# Patient Record
Sex: FEMALE | Race: WHITE | URBAN - METROPOLITAN AREA
[De-identification: names, ages, dates, MRNs, and addresses within clinical notes are randomized per-mention and may not be internally consistent; named-entity substitution may affect disease eponyms.]

---

## 2017-04-28 ENCOUNTER — OFFICE VISIT (OUTPATIENT)
Dept: FAMILY MEDICINE CLINIC | Age: 32
End: 2017-04-28

## 2017-04-28 VITALS
WEIGHT: 143 LBS | DIASTOLIC BLOOD PRESSURE: 77 MMHG | HEART RATE: 62 BPM | TEMPERATURE: 98.2 F | SYSTOLIC BLOOD PRESSURE: 119 MMHG | BODY MASS INDEX: 23.82 KG/M2 | HEIGHT: 65 IN | OXYGEN SATURATION: 100 % | RESPIRATION RATE: 14 BRPM

## 2017-04-28 DIAGNOSIS — K64.4 EXTERNAL HEMORRHOIDS: ICD-10-CM

## 2017-04-28 DIAGNOSIS — Z30.432 ENCOUNTER FOR IUD REMOVAL: Primary | ICD-10-CM

## 2017-04-28 RX ORDER — HYDROCORTISONE 25 MG/G
CREAM TOPICAL 4 TIMES DAILY
Qty: 30 G | Refills: 0 | Status: SHIPPED | OUTPATIENT
Start: 2017-04-28

## 2017-04-28 NOTE — PROGRESS NOTES
Subjective:   Nicole Siddiqi is an 27 y.o. female who presents for IUD removal.     IUD was placed 7 years ago. She desires for IUD to be removed today. She is not interested in family planning since her  got a vasectomy. She is sexually active with vasiliy. Discussed potential complications of IUD removal including damage to the cervix, vagina, and other surrounding structures; retention of IUD in the uterus; vaginal bleeding; and abdominal/pelvic cramping. Pt verbalized understanding and consented to removal of IUD. Patient also reports some itchy in the rectal area after defecation, states sometimes she sees some blood on paper after having BM, mild constipation, this happens few times a week, has not tried anything for the symptoms. Patient also interested in having a CPE but no labs. Is very active, exercises regularly and follows a healthy diet. Last PAP Smear around 2 years ago per patient report (normal), unsure if she got HPV test along with PAP. Allergies - reviewed:   No Known Allergies      Medications - reviewed:   Current Outpatient Prescriptions   Medication Sig    hydrocortisone (ANUSOL-HC) 2.5 % rectal cream Insert  into rectum four (4) times daily. No current facility-administered medications for this visit. Past Medical History - reviewed:  History reviewed. No pertinent past medical history. Past Surgical History - reviewed:   Past Surgical History:   Procedure Laterality Date    HX GYN  2/1/15             Social History - reviewed:  Social History     Social History    Marital status:      Spouse name: N/A    Number of children: N/A    Years of education: N/A     Occupational History    Not on file.      Social History Main Topics    Smoking status: Never Smoker    Smokeless tobacco: Not on file    Alcohol use Yes      Comment: occasionallys    Drug use: No    Sexual activity: Not on file     Other Topics Concern    Not on file Social History Narrative         Immunizations - reviewed:     ROS:  GI: + blood in paper after BM, rectal itching.  Denies abdominal pain  : Denies pelvic cramping, vaginal bleeding, and vaginal discharge      Objective:     Visit Vitals    /77    Pulse 62    Temp 98.2 °F (36.8 °C) (Oral)    Resp 14    Ht 5' 5\" (1.651 m)    Wt 143 lb (64.9 kg)    SpO2 100%    BMI 23.8 kg/m2     General appearance - alert, well appearing, and in no distress  Mental status - alert, oriented to person, place, and time  Eyes - pupils equal and reactive, extraocular eye movements intact  Ears - bilateral TM's and external ear canals normal  Nose - normal and patent, no erythema, discharge or polyps  Mouth - mucous membranes moist, pharynx normal without lesions  Neck - supple, no significant adenopathy   Chest - clear to auscultation, no wheezes, rales or rhonchi, symmetric air entry  Heart - normal rate, regular rhythm, normal S1, S2, no murmurs, rubs, clicks or gallops  Abdomen - soft, nontender, nondistended, no masses or organomegaly  Neurological - alert, oriented, normal speech, no focal findings or movement disorder   Extremities - peripheral pulses normal, no pedal edema, no clubbing or cyanosis  Skin - normal coloration and turgor, no rashes, no suspicious skin lesions noted  Pelvic exam: normal external genitalia, vulva, vagina, cervix, uterus and adnexa, exam chaperoned by Leo Main MD.      [address] [facility]  OFFICE PROCEDURE PROGRESS NOTE        Chart reviewed for the following:  Nallely Villegas MD, have reviewed the History, Physical and updated the Allergic reactions for [address] performed immediately prior to start of procedure:  Nallely Villegas MD, have performed the following reviews on [address] [place] [address] prior to the start of the procedure:            * Patient was identified by name and date of birth   * Agreement on procedure being performed was verified  * Risks and Benefits explained to the patient  * Procedure site verified and marked as necessary  * Patient was positioned for comfort  * Consent was signed and verified     Time: 11:13 AM    Date of procedure: 4/28/2017    Procedure performed by:  Yudy Crowley MD    Provider assisted by: Skyler Donovan MD    Patient assisted by: self    How tolerated by patient: tolerated the procedure well with no complications    Procedure in detail:    Pt placed in dorsal lithotomy position. A sterile speculum was placed in the patients vagina. The 2 IUD strings were visualized in the cervix. The strings were grasped using ringed forceps and gentle traction was applied to pull the IUD out of the cervix. The IUD was completely removed from the cervix and noted to be intact to visual inspection. The patient tolerated the procedure well. Assessment/Plan:       ICD-10-CM ICD-9-CM    1. Encounter for IUD removal Z30.432 V25.12 REMOVE INTRAUTERINE DEVICE   2. External hemorrhoids K64.4 455.3 hydrocortisone (ANUSOL-HC) 2.5 % rectal cream       IUD Removal:  · Informed patient that she may have some abdominal and/or pelvic cramping up to 1 week after removal. She can take Ibuprofen o rTylenol prn to relieve these symptoms. · Informed patient that she may have some vaginal spotting up to 1 week after removal.   · She is to return to clinic or go to the ER if she starts to experience severe abdominal or pelvic pain or heavy vaginal bleeding. Hemorrhoids:   1. Sitz bath twice or three times a day  2. High fiber diet  3. Plenty of fluids, fruits and vegetables to keep stools soft and prevent constipation  4. Fiber supplement (20-30g/day)  5. Tuc's pads after each bowel movement  6. Anusol as directed on the package    Sitz bath, Tuc's pads and hemorrhoid cream should give some early relief with resolution of symptoms in a few days to a week.   If above measures do not help then may consider referral to GI      I have discussed the diagnosis with the patient and the intended plan as seen in the above orders. Patient verbalized understanding of the plan and agrees with the plan. The patient has received an after-visit summary and questions were answered concerning future plans. I have discussed medication side effects and warnings with the patient as well. Informed patient to return to the office if new symptoms arise.       Dr. Jaxson Resendiz (attending) present throughout entire procedure      Pb Kim MD  Family Medicine Resident

## 2017-04-28 NOTE — PATIENT INSTRUCTIONS
IUD Removal: Care Instructions  Your Care Instructions    The intrauterine device (IUD) is a method of birth control. It is a small, plastic, T-shaped device that contains copper or hormones. It is placed in your uterus. You may have had your IUD removed because you want to become pregnant. Or maybe it caused pain, bleeding, or an infection. You may have chosen another method of birth control. If you don't want to get pregnant, make sure to use another form of birth control now that your IUD is not in place. Talk to your doctor about other forms of birth control. Follow-up care is a key part of your treatment and safety. Be sure to make and go to all appointments, and call your doctor if you are having problems. It's also a good idea to know your test results and keep a list of the medicines you take. How can you care for yourself at home? · IUD removal does not usually cause any pain or problems if the IUD is removed because you want to become pregnant or because of bleeding. · Once the IUD is taken out, you can become pregnant. If you want to become pregnant, you can start trying to have a baby as soon as you like. · If your doctor prescribed antibiotics because of an infection, take them as directed. Do not stop taking them just because you feel better. You need to take the full course of antibiotics. When should you call for help? Call 911 anytime you think you may need emergency care. For example, call if:  · You passed out (lost consciousness). Call your doctor now or seek immediate medical care if:  · You have severe vaginal bleeding. This means that you are soaking through your usual pads or tampons every hour for 2 or more hours and passing clots of blood. · You have a fever. · You feel sick to your stomach, or you vomit. · You have new or worse pelvic pain. · You have new or worse belly pain. · You are dizzy or lightheaded, or you feel like you may faint.   Watch closely for changes in your health, and be sure to contact your doctor if you have any problems. Where can you learn more? Go to http://kylie-severino.info/. Enter A487 in the search box to learn more about \"IUD Removal: Care Instructions. \"  Current as of: May 30, 2016  Content Version: 11.2  © 2549-4585 [employer]. Care instructions adapted under license by [employer]ock (which disclaims liability or warranty for this information). If you have questions about a medical condition or this instruction, always ask your healthcare professional. [address] any warranty or liability for your use of this information. Hemorrhoids: Care Instructions  Your Care Instructions    Hemorrhoids are enlarged veins that develop in the anal canal. Bleeding during bowel movements, itching, swelling, and rectal pain are the most common symptoms. They can be uncomfortable at times, but hemorrhoids rarely are a serious problem. You can treat most hemorrhoids with simple changes to your diet and bowel habits. These changes include eating more fiber and not straining to pass stools. Most hemorrhoids do not need surgery or other treatment unless they are very large and painful or bleed a lot. Follow-up care is a key part of your treatment and safety. Be sure to make and go to all appointments, and call your doctor if you are having problems. Its also a good idea to know your test results and keep a list of the medicines you take. How can you care for yourself at home? · Sit in a few inches of warm water (sitz bath) 3 times a day and after bowel movements. The warm water helps with pain and itching. · Put ice on your anal area several times a day for 10 minutes at a time. Put a thin cloth between the ice and your skin. Follow this by placing a warm, wet towel on the area for another 10 to 20 minutes. · Take pain medicines exactly as directed.   ¨ If the doctor gave you a prescription medicine for pain, take it as prescribed. ¨ If you are not taking a prescription pain medicine, ask your doctor if you can take an over-the-counter medicine. · Keep the anal area clean, but be gentle. Use water and a fragrance-free soap, such as [place], or use baby wipes or medicated pads, such as Tucks. · Wear cotton underwear and loose clothing to decrease moisture in the anal area. · Eat more fiber. Include foods such as whole-grain breads and cereals, raw vegetables, raw and dried fruits, and beans. · Drink plenty of fluids, enough so that your urine is light yellow or clear like water. If you have kidney, heart, or liver disease and have to limit fluids, talk with your doctor before you increase the amount of fluids you drink. · Use a stool softener that contains bran or psyllium. You can save money by buying bran or psyllium (available in bulk at most health food stores) and sprinkling it on foods or stirring it into fruit juice. Or you can use a product such as Metamucil or Hydrocil. · Practice healthy bowel habits. ¨ Go to the bathroom as soon as you have the urge. ¨ Avoid straining to pass stools. Relax and give yourself time to let things happen naturally. ¨ Do not hold your breath while passing stools. ¨ Do not read while sitting on the toilet. Get off the toilet as soon as you have finished. · Take your medicines exactly as prescribed. Call your doctor if you think you are having a problem with your medicine. When should you call for help? Call 911 anytime you think you may need emergency care. For example, call if:  · You pass maroon or very bloody stools. Call your doctor now or seek immediate medical care if:  · You have increased pain. · You have increased bleeding. Watch closely for changes in your health, and be sure to contact your doctor if:  · Your symptoms have not improved after 3 or 4 days. Where can you learn more? Go to http://laura.info/.   Enter F228 in the search box to learn more about \"Hemorrhoids: Care Instructions. \"  Current as of: August 9, 2016  Content Version: 11.2  © 5358-1687 [employer]Audience[employer]. Care instructions adapted under license by ArmedZilla (which disclaims liability or warranty for this information). If you have questions about a medical condition or this instruction, always ask your healthcare professional. [place] [address] any warranty or liability for your use of this information.

## 2017-04-28 NOTE — MR AVS SNAPSHOT
Visit Information Date & Time Provider Department Dept. Phone Encounter #  
 4/28/2017  8:20 AM Shaan Espino, Eulalia Roy [phone] [id number] Upcoming Health Maintenance Date Due DTaP/Tdap/Td series (1 - Tdap) 4/5/2004 PAP AKA CERVICAL CYTOLOGY 4/5/2004 INFLUENZA AGE 9 TO ADULT 8/1/2016 Allergies as of 4/28/2017  Review Complete On: 4/28/2017 By: Shea Ventura LPN No Known Allergies Current Immunizations  Never Reviewed No immunizations on file. Not reviewed this visit You Were Diagnosed With   
  
 Codes Comments Encounter for IUD removal    -  Primary ICD-10-CM: F99.149 ICD-9-CM: V25.12 External hemorrhoids     ICD-10-CM: K64.4 ICD-9-CM: 662. 3 Vitals BP Pulse Temp Resp Height(growth percentile) Weight(growth percentile) 119/77 62 98.2 °F (36.8 °C) (Oral) 14 5' 5\" (1.651 m) 143 lb (64.9 kg) SpO2 BMI OB Status Smoking Status 100% 23.8 kg/m2 IUD Never Smoker Vitals History BMI and BSA Data Body Mass Index Body Surface Area  
 23.8 kg/m 2 1.73 m 2 Preferred Pharmacy Pharmacy Name Phone [facility] [address] [place] [address] [phone] Your Updated Medication List  
  
   
This list is accurate as of: 4/28/17  9:07 AM.  Always use your most recent med list.  
  
  
  
  
 hydrocortisone 2.5 % rectal cream  
Commonly known as:  ANUSOL-HC Insert  into rectum four (4) times daily. Prescriptions Sent to Pharmacy Refills  
 hydrocortisone (ANUSOL-HC) 2.5 % rectal cream 0 Sig: Insert  into rectum four (4) times daily. Class: Normal  
 Pharmacy: [facility], [place] [address] [place] [address] #: [phone] Route: Rectal  
  
We Performed the Following REMOVE INTRAUTERINE DEVICE [55371 CPT(R)] Patient Instructions IUD Removal: Care Instructions Your Care Instructions The intrauterine device (IUD) is a method of birth control. It is a small, plastic, T-shaped device that contains copper or hormones. It is placed in your uterus. You may have had your IUD removed because you want to become pregnant. Or maybe it caused pain, bleeding, or an infection. You may have chosen another method of birth control. If you don't want to get pregnant, make sure to use another form of birth control now that your IUD is not in place. Talk to your doctor about other forms of birth control. Follow-up care is a key part of your treatment and safety. Be sure to make and go to all appointments, and call your doctor if you are having problems. It's also a good idea to know your test results and keep a list of the medicines you take. How can you care for yourself at home? · IUD removal does not usually cause any pain or problems if the IUD is removed because you want to become pregnant or because of bleeding. · Once the IUD is taken out, you can become pregnant. If you want to become pregnant, you can start trying to have a baby as soon as you like. · If your doctor prescribed antibiotics because of an infection, take them as directed. Do not stop taking them just because you feel better. You need to take the full course of antibiotics. When should you call for help? Call 911 anytime you think you may need emergency care. For example, call if: 
· You passed out (lost consciousness). Call your doctor now or seek immediate medical care if: 
· You have severe vaginal bleeding. This means that you are soaking through your usual pads or tampons every hour for 2 or more hours and passing clots of blood. · You have a fever. · You feel sick to your stomach, or you vomit. · You have new or worse pelvic pain. · You have new or worse belly pain. · You are dizzy or lightheaded, or you feel like you may faint.  
Watch closely for changes in your health, and be sure to contact your doctor if you have any problems. Where can you learn more? Go to http://kylie-severino.info/. Enter Q656 in the search box to learn more about \"IUD Removal: Care Instructions. \" Current as of: May 30, 2016 Content Version: 11.2 © 6511-9251 [employer]Value. Care instructions adapted under license by Librestream Technologies [employer]. (which disclaims liability or warranty for this information). If you have questions about a medical condition or this instruction, always ask your healthcare professional. [place] [address] any warranty or liability for your use of this information. Hemorrhoids: Care Instructions Your Care Instructions Hemorrhoids are enlarged veins that develop in the anal canal. Bleeding during bowel movements, itching, swelling, and rectal pain are the most common symptoms. They can be uncomfortable at times, but hemorrhoids rarely are a serious problem. You can treat most hemorrhoids with simple changes to your diet and bowel habits. These changes include eating more fiber and not straining to pass stools. Most hemorrhoids do not need surgery or other treatment unless they are very large and painful or bleed a lot. Follow-up care is a key part of your treatment and safety. Be sure to make and go to all appointments, and call your doctor if you are having problems. Its also a good idea to know your test results and keep a list of the medicines you take. How can you care for yourself at home? · Sit in a few inches of warm water (sitz bath) 3 times a day and after bowel movements. The warm water helps with pain and itching. · Put ice on your anal area several times a day for 10 minutes at a time. Put a thin cloth between the ice and your skin. Follow this by placing a warm, wet towel on the area for another 10 to 20 minutes. · Take pain medicines exactly as directed. ¨ If the doctor gave you a prescription medicine for pain, take it as prescribed. ¨ If you are not taking a prescription pain medicine, ask your doctor if you can take an over-the-counter medicine. · Keep the anal area clean, but be gentle. Use water and a fragrance-free soap, such as [place], or use baby wipes or medicated pads, such as Tucks. · Wear cotton underwear and loose clothing to decrease moisture in the anal area. · Eat more fiber. Include foods such as whole-grain breads and cereals, raw vegetables, raw and dried fruits, and beans. · Drink plenty of fluids, enough so that your urine is light yellow or clear like water. If you have kidney, heart, or liver disease and have to limit fluids, talk with your doctor before you increase the amount of fluids you drink. · Use a stool softener that contains bran or psyllium. You can save money by buying bran or psyllium (available in bulk at most health food stores) and sprinkling it on foods or stirring it into fruit juice. Or you can use a product such as Metamucil or Hydrocil. · Practice healthy bowel habits. ¨ Go to the bathroom as soon as you have the urge. ¨ Avoid straining to pass stools. Relax and give yourself time to let things happen naturally. ¨ Do not hold your breath while passing stools. ¨ Do not read while sitting on the toilet. Get off the toilet as soon as you have finished. · Take your medicines exactly as prescribed. Call your doctor if you think you are having a problem with your medicine. When should you call for help? Call 911 anytime you think you may need emergency care. For example, call if: 
· You pass maroon or very bloody stools. Call your doctor now or seek immediate medical care if: 
· You have increased pain. · You have increased bleeding. Watch closely for changes in your health, and be sure to contact your doctor if: 
· Your symptoms have not improved after 3 or 4 days. Where can you learn more? Go to http://laura.info/. Enter F228 in the search box to learn more about \"Hemorrhoids: Care Instructions. \" Current as of: August 9, 2016 Content Version: 11.2 © 3088-6861 Jobzle, [employer]ensor[employer]. Care instructions adapted under license by Giftology (which disclaims liability or warranty for this information). If you have questions about a medical condition or this instruction, always ask your healthcare professional. [address] any warranty or liability for your use of this information. Introducing [facility]! Itzel Kuhn introduces OurShelf patient portal. Now you can access parts of your medical record, email your doctor's office, and request medication refills online. 1. In your internet browser, go to [email or web address] 2. Click on the First Time User? Click Here link in the Sign In box. You will see the New Member Sign Up page. 3. Enter your OurShelf Access Code exactly as it appears below. You will not need to use this code after youve completed the sign-up process. If you do not sign up before the expiration date, you must request a new code. · OurShelf Access Code: [email or web address] Expires: 7/27/2017  8:57 AM 
 
4. Enter the last four digits of your Social Security Number (xxxx) and Date of Birth (mm/dd/yyyy) as indicated and click Submit. You will be taken to the next sign-up page. 5. Create a OurShelf ID. This will be your OurShelf login ID and cannot be changed, so think of one that is secure and easy to remember. 6. Create a OurShelf password. You can change your password at any time. 7. Enter your Password Reset Question and Answer. This can be used at a later time if you forget your password. 8. Enter your e-mail address. You will receive e-mail notification when new information is available in [address] 9. Click Sign Up. You can now view and download portions of your medical record. 10. Click the Download Summary menu link to download a portable copy of your medical information. If you have questions, please visit the Frequently Asked Questions section of the Single Digits website. Remember, Single Digits is NOT to be used for urgent needs. For medical emergencies, dial 911. Now available from your iPhone and Android! Please provide this summary of care documentation to your next provider. Your primary care clinician is listed as [address] [place] [address]. If you have any questions after today's visit, please call [phone].

## 2018-12-20 ENCOUNTER — OFFICE VISIT (OUTPATIENT)
Dept: FAMILY MEDICINE CLINIC | Age: 33
End: 2018-12-20

## 2018-12-20 VITALS
BODY MASS INDEX: 24.49 KG/M2 | DIASTOLIC BLOOD PRESSURE: 70 MMHG | OXYGEN SATURATION: 98 % | SYSTOLIC BLOOD PRESSURE: 113 MMHG | RESPIRATION RATE: 16 BRPM | TEMPERATURE: 98.9 F | HEART RATE: 65 BPM | HEIGHT: 65 IN | WEIGHT: 147 LBS

## 2018-12-20 DIAGNOSIS — M53.3 SACRO ILIAL PAIN: Primary | ICD-10-CM

## 2018-12-20 RX ORDER — CYCLOBENZAPRINE HCL 10 MG
5 TABLET ORAL
Qty: 15 TAB | Refills: 0 | Status: SHIPPED | OUTPATIENT
Start: 2018-12-20

## 2018-12-20 NOTE — PATIENT INSTRUCTIONS
Sacroiliac Joint Pain: Care Instructions  Your Care Instructions    The sacroiliac joints connect the spine and each side of the pelvis. These joints bear the weight and stress of your torso. This makes them easy to injure. Injury or overuse of these joints may cause low back pain. Stress on these joints can cause joint pain. Sacroiliac joint pain is more common in pregnant women. Certain kinds of arthritis also may cause this type of joint pain. Home treatment may help you feel better. So can avoiding activities that stress your back. Your doctor also may recommend physical therapy. This may include doing exercises and stretches to help with pain. You may also learn to use good posture. Follow-up care is a key part of your treatment and safety. Be sure to make and go to all appointments, and call your doctor if you are having problems. It's also a good idea to know your test results and keep a list of the medicines you take. How can you care for yourself at home? · Ask your doctor about light exercises that may help your back pain. Try to do light activity throughout the day. But make sure to take rests as needed. Find a comfortable position for rest, but don't stay in one position for too long. Avoid activities that cause pain. · To apply heat, put a warm water bottle, a heating pad set on low, or a warm cloth on your back. Do not go to sleep with a heating pad on your skin. · Put ice or a cold pack on your back for 10 to 20 minutes at a time. Put a thin cloth between the ice and your skin. · If the doctor gave you a prescription medicine for pain, take it as prescribed. · If you are not taking a prescription pain medicine, ask your doctor if you can take an over-the-counter pain medicine, such as acetaminophen (Tylenol), ibuprofen (Advil, Motrin), or naproxen (Aleve). Read and follow all instructions on the label. Take pain medicines exactly as directed.   · Do not take two or more pain medicines at the same time unless the doctor told you to. Many pain medicines have acetaminophen, which is Tylenol. Too much acetaminophen (Tylenol) can be harmful. · To prevent future back pain, do exercises to stretch and strengthen your back and stomach. Learn how to use good posture, safe lifting techniques, and proper body mechanics. When should you call for help? Call 911 anytime you think you may need emergency care. For example, call if:    · You are unable to move a leg at all.   [facility] your doctor now or seek immediate medical care if:    · You have new or worse symptoms in your legs or buttocks. Symptoms may include:  ? Numbness or tingling. ? Weakness. ? Pain.     · You lose bladder or bowel control.    Watch closely for changes in your health, and be sure to contact your doctor if:    · You are not getting better as expected. Where can you learn more? Go to http://kylie-severino.info/. Enter E923 in the search box to learn more about \"Sacroiliac Joint Pain: Care Instructions. \"  Current as of: November 29, 2017  Content Version: 11.8  © 2209-9928 [employer]. Care instructions adapted under license by [employer] (which disclaims liability or warranty for this information). If you have questions about a medical condition or this instruction, always ask your healthcare professional. [address] any warranty or liability for your use of this information.

## 2018-12-20 NOTE — PROGRESS NOTES
[address] [facility] [address] [address]   Office [phone], Fax [phone]    Subjective:     Chief Complaint   Patient presents with    Back Pain     after working out 12/17/18     History provided by patient     HPI:  Ramiro Fung is a 26 y.o. WHITE OR  female presents for back pain. No significant history pertinent to presentation. Back Pain  - Pt was working out on Monday (insanity workout for first time) and starting tuesday more her back pain was hurting. Mainly worse with bending. Been taking Ibuprofen 600mg TID and icing with minimal relief. Denies fever/chills, weakness, sensation difference, urine/bowel incontinence. Medication reviewed. Allergy reviewed. ROS (bolded are positive):   General Negative for fever, chills, changes in weight, changes in appetite   CV Negative for chest pain, palpitations, edema   Respiratory Negative for cough, shortness of breath, wheezing   MSK Negative for back pain, joint pain, muscle pain   Neuro Negative for dizziness, headache, confusion, weakness   Psych Negative for anxiety, depression, change in mood     Objective:   Vitals - reviewed  Visit Vitals  /70   Pulse 65   Temp 98.9 °F (37.2 °C) (Oral)   Resp 16   Ht 5' 5\" (1.651 m)   Wt 147 lb (66.7 kg)   SpO2 98%   BMI 24.46 kg/m²        Physical exam:   GEN: NAD. Alert. Well nourished. LUNGS: Respirations unlabored; CTAB. no wheeze, rales, rhonchi   CARDIOVASCULAR: Regular, rate, and rhythm without murmurs, gallops or rubs   ABDOMEN: Soft; NT, ND. +bowel sounds; no rebound tenderness, no guarding. no masses or organomegaly  NEUROLOGIC:  CN II-XII intact. Strength and sensation intact. EXT: Well perfused. No edema. No erythema. PSYCH: appropriate mood and affect. Good insight and judgement. Cooperative. SKIN: No obvious rashes or lesions.      MSK:    Posture: Normal   Deformity: None    ROM:     Flexion: Normal    Extension: Normal     Lateral bending: Normal Gait: Normal       Palpation:    L1-L5: No tenderness    Sacrum-iliac: positive tenderness    Coccyx: No tenderness    Left Paraspinal: No tenderness    Right Paraspinal: positive tenderness     Strength (0-5/5)    Hip Flexion:   Left: 5/5  Right: 5/5    Hip Extension:  Left: 5/5  Right: 5/5    Hip Abduction:  Left: 5/5  Right: 5/5    Hip Adduction:  Left: 5/5  Right: 5/5    Knee Extension:  Left: 5/5  Right: 5/5    Knee Flexion:   Left: 5/5  Right: 5/5     Sensation: Intact, no deficits      DTR:    Patella:  Left: +2  Right: +2    Achilles:  Left: +2  Right: +2     Special test:    Straight leg: Left: Negative  Right: Negative    Jerads: Left: Negative  Right: Negative    Piriformis: Left: Negative  Right: Negative    Gaenslen:        Left: Negative  Right: Negative    Pertinent Labs/Studies:   - n/a    Assessment and orders:       ICD-10-CM ICD-9-CM    1. Sacro ilial pain M53.3 724.6 REFERRAL TO PHYSICAL THERAPY      cyclobenzaprine (FLEXERIL) 10 mg tablet     Diagnoses and all orders for this visit:    1. Sacro ilial pain. Given flexeril for spasm and told to only take it for the first week. To continue to take anti-inflammatory (ibuprofen) and to ice/heat as it helps. Referred to physical therapy. Informed about our sports medicine specialist and to return if would like steroid injections, if not improved. -     REFERRAL TO PHYSICAL THERAPY  -     cyclobenzaprine (FLEXERIL) 10 mg tablet; Take 0.5 Tabs by mouth three (3) times daily as needed for Muscle Spasm(s). Follow-up Disposition:  Return if symptoms worsen or fail to improve. Pt was discussed with Dr Fabrice Christianson (attending physician). I have reviewed patient medical and social history and medications. I have reviewed pertinent labs results and other data. I have discussed the diagnosis with the patient and the intended plan as seen in the above orders.  The patient has received an after-visit summary and questions were answered concerning future plans. I have discussed medication side effects and warnings with the patient as well.     Franki Lugo MD  Resident [facility]  12/20/18

## 2018-12-20 NOTE — PROGRESS NOTES
Chief Complaint   Patient presents with    Back Pain     after working out 12/17/18     1. Have you been to the ER, urgent care clinic since your last visit? Hospitalized since your last visit? No    2. Have you seen or consulted any other health care providers outside of the [address] [place] [address] since your last visit? Include any pap smears or colon screening.  No

## 2018-12-21 NOTE — PROGRESS NOTES
[facility] Residency Attending Addendum:  I saw and evaluated the patient on the day of the encounter with Layla Flores MD  , performing the key elements of the service. I discussed the findings, assessment and plan with the resident and agree with the resident's findings and plan as documented in the resident's note.       Costa Bush MD, CAQSM, RMSK

## 2019-03-14 ENCOUNTER — HOSPITAL ENCOUNTER (OUTPATIENT)
Dept: LAB | Age: 34
Discharge: HOME | End: 2019-03-14
Payer: COMMERCIAL

## 2019-03-14 ENCOUNTER — OFFICE VISIT (OUTPATIENT)
Dept: FAMILY MEDICINE CLINIC | Age: 34
End: 2019-03-14

## 2019-03-14 VITALS
HEART RATE: 63 BPM | BODY MASS INDEX: 23.66 KG/M2 | RESPIRATION RATE: 16 BRPM | DIASTOLIC BLOOD PRESSURE: 73 MMHG | TEMPERATURE: 98.3 F | HEIGHT: 65 IN | WEIGHT: 142 LBS | SYSTOLIC BLOOD PRESSURE: 115 MMHG | OXYGEN SATURATION: 100 %

## 2019-03-14 DIAGNOSIS — Z01.419 WELL WOMAN EXAM WITH ROUTINE GYNECOLOGICAL EXAM: Primary | ICD-10-CM

## 2019-03-14 DIAGNOSIS — K64.9 HEMORRHOIDS, UNSPECIFIED HEMORRHOID TYPE: ICD-10-CM

## 2019-03-14 DIAGNOSIS — K59.00 CONSTIPATION, UNSPECIFIED CONSTIPATION TYPE: ICD-10-CM

## 2019-03-14 DIAGNOSIS — Z71.84 ENCOUNTER FOR COUNSELING FOR TRAVEL: ICD-10-CM

## 2019-03-14 PROCEDURE — 88175 CYTOPATH C/V AUTO FLUID REDO: CPT

## 2019-03-14 PROCEDURE — 87624 HPV HI-RISK TYP POOLED RSLT: CPT

## 2019-03-14 RX ORDER — POLYETHYLENE GLYCOL 3350 17 G/17G
17 POWDER, FOR SOLUTION ORAL DAILY
Qty: 30 PACKET | Refills: 4 | Status: SHIPPED | OUTPATIENT
Start: 2019-03-14 | End: 2019-06-12

## 2019-03-14 NOTE — PROGRESS NOTES
1. Have you been to the ER, urgent care clinic since your last visit? Hospitalized since your last visit? Yes, 02/19/19 [facility] for strep throat    2. Have you seen or consulted any other health care providers outside of the [address] [place] [address] since your last visit? Include any pap smears or colon screening. No    Chief Complaint   Patient presents with    Physical    Labs       Blood pressure 115/73, pulse 63, temperature 98.3 °F (36.8 °C), temperature source Oral, resp. rate 16, height 5' 5\" (1.651 m), weight 142 lb (64.4 kg), SpO2 100 %.

## 2019-03-14 NOTE — PROGRESS NOTES
[address] [facility]    Subjective:     CC: Complete pgyscial  History provided by patient and chart review    HPI:  Very pleasant 26 yr old female with no significant pmh presenting to clinic today for a complete physical exam. No recent travel, illness or hospitalization. She is requestingabwork and physical prior to upcoming travel. Reports occasional bright blood with wiping after BMs. Has hx of hemorrhoids and intermittent constipation. She does endorse occasional hard stools with moderate amount of strain with BMs. Has tried miralax inconsistently in past. Denies any melena or hematochezia. No abdominal pain, nausea or vomiting. History reviewed. No pertinent past medical history. No Known Allergies  Current Outpatient Medications on File Prior to Visit   Medication Sig Dispense Refill    cyclobenzaprine (FLEXERIL) 10 mg tablet Take 0.5 Tabs by mouth three (3) times daily as needed for Muscle Spasm(s). 15 Tab 0    hydrocortisone (ANUSOL-HC) 2.5 % rectal cream Insert  into rectum four (4) times daily. 30 g 0     No current facility-administered medications on file prior to visit. History reviewed. No pertinent family history. Social History     Socioeconomic History    Marital status:      Spouse name: Not on file    Number of children: Not on file    Years of education: Not on file    Highest education level: Not on file   Tobacco Use    Smoking status: Never Smoker    Smokeless tobacco: Never Used   Substance and Sexual Activity    Alcohol use: Yes     Comment: occasionallys    Drug use: No     Past Surgical History:   Procedure Laterality Date    HX GYN  2/1/15           There is no problem list on file for this patient. Review of Systems   All other systems reviewed and are negative.         Objective:     Visit Vitals  /73 (BP 1 Location: Right arm, BP Patient Position: Sitting)   Pulse 63   Temp 98.3 °F (36.8 °C) (Oral)   Resp 16   Ht 5' 5\" (1.651 m)   Wt 142 lb (64.4 kg)   SpO2 100%   BMI 23.63 kg/m²        Physical Exam   Constitutional: She is oriented to person, place, and time. She appears well-developed and well-nourished. No distress. HENT:   Head: Normocephalic and atraumatic. Nose: Nose normal.   Mouth/Throat: Oropharynx is clear and moist.   Eyes: Conjunctivae and EOM are normal. Pupils are equal, round, and reactive to light. Right eye exhibits no discharge. Left eye exhibits no discharge. Neck: Normal range of motion. Neck supple. No thyromegaly present. Cardiovascular: Normal rate, regular rhythm, normal heart sounds and intact distal pulses. Exam reveals no gallop and no friction rub. No murmur heard. Pulmonary/Chest: Effort normal and breath sounds normal. No respiratory distress. Abdominal: Soft. Bowel sounds are normal. She exhibits no distension and no mass. There is no tenderness. There is no rebound and no guarding. Genitourinary: Vagina normal. No vaginal discharge found. Genitourinary Comments:   Rectal Exam: Visible non-thrombosed external Hemorrhoids at 7 and 5 o'clock positions. Musculoskeletal: Normal range of motion. She exhibits no edema. Lymphadenopathy:     She has no cervical adenopathy. Neurological: She is alert and oriented to person, place, and time. No cranial nerve deficit. She exhibits normal muscle tone. Coordination normal.   Skin: Skin is warm and dry. Psychiatric: She has a normal mood and affect. Her behavior is normal.       Pertinent Labs/Studies:      Assessment and orders:     Diagnoses and all orders for this visit:    1) Well woman exam with routine gynecological exam  -     CBC WITH AUTOMATED DIFF  -     METABOLIC PANEL, COMPREHENSIVE  -     LIPID PANEL  -     PAP IG, APTIMA HPV AND RFX 16/18,45 ([id number])    2) Hemorrhoids, unspecified hemorrhoid type  3) Constipation, unspecified constipation type       -      Trial of Miralax.  Increase dietary fiber and hydration  - polyethylene glycol (MIRALAX) 17 gram packet; Take 1 Packet by mouth daily for 90 days. , Normal, Disp-30 Packet, R-4    4) Encounter for counseling for travel  -     HEPATITIS PANEL, ACUTE        I have reviewed patient medical and social history and medications. I have reviewed pertinent labs results and other data. I have discussed the diagnosis with the patient and the intended plan as seen in the above orders. The patient has received an after-visit summary and questions were answered concerning future plans. I have discussed medication side effects and warnings with the patient as well.     Azalea Cedeno MD  Resident [address] [facility] [address]  03/14/19    Patient discussed with Dr. Ellen Moon, Attending Physician

## 2019-03-15 LAB
ALBUMIN SERPL-MCNC: 4.7 G/DL (ref 3.5–5.5)
ALBUMIN/GLOB SERPL: 1.7 {RATIO} (ref 1.2–2.2)
ALP SERPL-CCNC: 49 IU/L (ref 39–117)
ALT SERPL-CCNC: 11 IU/L (ref 0–32)
AST SERPL-CCNC: 14 IU/L (ref 0–40)
BASOPHILS # BLD AUTO: 0 X10E3/UL (ref 0–0.2)
BASOPHILS NFR BLD AUTO: 1 %
BILIRUB SERPL-MCNC: 0.6 MG/DL (ref 0–1.2)
BUN SERPL-MCNC: 19 MG/DL (ref 6–20)
BUN/CREAT SERPL: 19 (ref 9–23)
CALCIUM SERPL-MCNC: 9.6 MG/DL (ref 8.7–10.2)
CHLORIDE SERPL-SCNC: 100 MMOL/L (ref 96–106)
CHOLEST SERPL-MCNC: 189 MG/DL (ref 100–199)
CO2 SERPL-SCNC: 23 MMOL/L (ref 20–29)
CREAT SERPL-MCNC: 1 MG/DL (ref 0.57–1)
EOSINOPHIL # BLD AUTO: 0.1 X10E3/UL (ref 0–0.4)
EOSINOPHIL NFR BLD AUTO: 2 %
ERYTHROCYTE [DISTWIDTH] IN BLOOD BY AUTOMATED COUNT: 13.4 % (ref 12.3–15.4)
GLOBULIN SER CALC-MCNC: 2.7 G/DL (ref 1.5–4.5)
GLUCOSE SERPL-MCNC: 87 MG/DL (ref 65–99)
HAV IGM SERPL QL IA: NEGATIVE
HBV CORE IGM SERPL QL IA: NEGATIVE
HBV SURFACE AG SERPL QL IA: NEGATIVE
HCT VFR BLD AUTO: 37.7 % (ref 34–46.6)
HCV AB S/CO SERPL IA: <0.1 S/CO RATIO (ref 0–0.9)
HDLC SERPL-MCNC: 63 MG/DL
HGB BLD-MCNC: 12.7 G/DL (ref 11.1–15.9)
IMM GRANULOCYTES # BLD AUTO: 0 X10E3/UL (ref 0–0.1)
IMM GRANULOCYTES NFR BLD AUTO: 0 %
INTERPRETATION, 910389: NORMAL
LDLC SERPL CALC-MCNC: 117 MG/DL (ref 0–99)
LYMPHOCYTES # BLD AUTO: 1 X10E3/UL (ref 0.7–3.1)
LYMPHOCYTES NFR BLD AUTO: 29 %
MCH RBC QN AUTO: 30.4 PG (ref 26.6–33)
MCHC RBC AUTO-ENTMCNC: 33.7 G/DL (ref 31.5–35.7)
MCV RBC AUTO: 90 FL (ref 79–97)
MONOCYTES # BLD AUTO: 0.5 X10E3/UL (ref 0.1–0.9)
MONOCYTES NFR BLD AUTO: 14 %
NEUTROPHILS # BLD AUTO: 1.8 X10E3/UL (ref 1.4–7)
NEUTROPHILS NFR BLD AUTO: 54 %
PLATELET # BLD AUTO: 260 X10E3/UL (ref 150–379)
POTASSIUM SERPL-SCNC: 4.3 MMOL/L (ref 3.5–5.2)
PROT SERPL-MCNC: 7.4 G/DL (ref 6–8.5)
RBC # BLD AUTO: 4.18 X10E6/UL (ref 3.77–5.28)
SODIUM SERPL-SCNC: 139 MMOL/L (ref 134–144)
TRIGL SERPL-MCNC: 46 MG/DL (ref 0–149)
VLDLC SERPL CALC-MCNC: 9 MG/DL (ref 5–40)
WBC # BLD AUTO: 3.4 X10E3/UL (ref 3.4–10.8)

## 2019-04-02 ENCOUNTER — TELEPHONE (OUTPATIENT)
Dept: FAMILY MEDICINE CLINIC | Age: 34
End: 2019-04-02

## 2019-04-02 NOTE — TELEPHONE ENCOUNTER
Patient calling and states she was told by /Kelsey SHINE that she would be contacted when lab results had been reviewed by doctor on today from visit of 3/14/19.     Nurse Alejandra Rod took call

## 2019-04-02 NOTE — TELEPHONE ENCOUNTER
Phone call transferred to Dr. Terence Stephens, physician informed patient of recent lab results. Office notes and recent labs printed per physician and patient request, labs and notes placed at the  for . No further action required at this time.

## 2019-05-06 ENCOUNTER — TELEPHONE (OUTPATIENT)
Dept: FAMILY MEDICINE CLINIC | Age: 34
End: 2019-05-06

## 2019-05-06 NOTE — TELEPHONE ENCOUNTER
[phone]\  Patient said she was seen recently by the doctor regarding documentation she needed for moving [place] seas. Now she is saying they are requesting  stool culture results. She is asking if she can  a stool card without needing another appointment. Please advise.

## 2019-05-10 DIAGNOSIS — Z71.84 COUNSELING FOR TRAVEL: Primary | ICD-10-CM

## 2019-05-15 NOTE — TELEPHONE ENCOUNTER
Dr. Sheryl Mirza: Today   Message Contents   Tariq Jang 28             Pt advised that she never recieved a callback with instructions or appointment information for the stool culture. Best contact number is [phone].

## 2019-05-16 ENCOUNTER — LAB ONLY (OUTPATIENT)
Dept: FAMILY MEDICINE CLINIC | Age: 34
End: 2019-05-16

## 2019-05-16 DIAGNOSIS — Z12.11 COLON CANCER SCREENING: Primary | ICD-10-CM

## 2019-05-18 LAB — HEMOCCULT STL QL IA: NEGATIVE

## 2019-05-21 ENCOUNTER — TELEPHONE (OUTPATIENT)
Dept: FAMILY MEDICINE CLINIC | Age: 34
End: 2019-05-21

## 2019-05-21 NOTE — TELEPHONE ENCOUNTER
Covering for Dr. Magdalena Mcleod. Attempted to contact pt several times but not able to reach. Wanted to share the occult blood result was negative. Did not leave a VM. Will attempt to contact at a later time.     Keith Turner, DO

## 2020-01-27 ENCOUNTER — TELEPHONE (OUTPATIENT)
Dept: FAMILY MEDICINE CLINIC | Age: 35
End: 2020-01-27

## 2020-01-27 DIAGNOSIS — A09 TRAVELER'S DIARRHEA: Primary | ICD-10-CM

## 2020-01-27 RX ORDER — CIPROFLOXACIN 500 MG/1
TABLET ORAL
Qty: 10 TAB | Refills: 0 | Status: SHIPPED | OUTPATIENT
Start: 2020-01-27

## 2020-01-27 NOTE — TELEPHONE ENCOUNTER
Patient last seen:  March 14, 2019  09:15 AM 4 claim created  Audit history [facility]-MAIN OFFICE [id number]  Complete Physical, 30 min    Patient is going to [facility] and is requesting an Rx (Cipro) to take with her just incase she gets sick?     Please advise thanks

## 2021-02-08 ENCOUNTER — OFFICE VISIT (OUTPATIENT)
Dept: FAMILY MEDICINE CLINIC | Age: 36
End: 2021-02-08
Payer: COMMERCIAL

## 2021-02-08 VITALS
TEMPERATURE: 97.8 F | BODY MASS INDEX: 24.27 KG/M2 | OXYGEN SATURATION: 98 % | HEIGHT: 66 IN | SYSTOLIC BLOOD PRESSURE: 114 MMHG | RESPIRATION RATE: 16 BRPM | WEIGHT: 151 LBS | DIASTOLIC BLOOD PRESSURE: 68 MMHG | HEART RATE: 68 BPM

## 2021-02-08 DIAGNOSIS — T14.8XXA MUSCLE STRAIN: ICD-10-CM

## 2021-02-08 DIAGNOSIS — R10.2 PELVIC PAIN: Primary | ICD-10-CM

## 2021-02-08 LAB
HCG URINE, QL. (POC): NEGATIVE
VALID INTERNAL CONTROL?: YES

## 2021-02-08 PROCEDURE — 81025 URINE PREGNANCY TEST: CPT | Performed by: STUDENT IN AN ORGANIZED HEALTH CARE EDUCATION/TRAINING PROGRAM

## 2021-02-08 PROCEDURE — 99213 OFFICE O/P EST LOW 20 MIN: CPT | Performed by: STUDENT IN AN ORGANIZED HEALTH CARE EDUCATION/TRAINING PROGRAM

## 2021-02-08 NOTE — PROGRESS NOTES
[address] [facility] [address] [address]   Office [phone], Fax [phone]    Subjective:     Chief Complaint   Patient presents with    Abdominal Pain     Patient presents complaining of abdominal pain x 3 days; suspects a hernia. History provided by patient     HPI:  Isabella Tellez is a 38 y.o. WHITE [place]  female w/ no significant PMH presents for evaluation of RLQ pain x3 days. RLQ abdominal pain: Pt reports pain (sharp, 6/10) at C/S scar x3 days. Intermittent with planks/pull-up/sit-ups/weight liifting causing her to stop. No meds. - States she hasn't noticed any bulging.   - Worked out today in the gym as well but she says she has tried to limit any abdominal exercises due to the pain  - LMP 1/15/21 (regular menses, uses condoms, does not think she is pregnant)  - No N/V/D/C/vaginal bleeding/fever/chills. Medication reviewed. Allergy reviewed. SocHx. - Denies smoking, alcohol use, illcit drug use.      ROS (bolded are positive):   General Negative for fever, chills, changes in weight, changes in appetite   HEENT Negative for hearing loss, earache, congestion, sore throat   CV Negative for chest pain, palpitations, edema   Respiratory Negative for cough, shortness of breath, wheezing   GI Negative for change in bowel habits, abdominal pain, black or bloody stools, nausea or vomiting    Negative for frequency, dysuria, hematuria, vaginal discharge   MSK Negative for back pain, joint pain, muscle pain   Breast Negative for breast lumps, nipple discharge, galactorrhea   Skin Negative for itching, rash, hives   Neuro Negative for dizziness, headache, confusion, weakness   Psych Negative for anxiety, depression, change in mood   Heme/lymph Negative for bleeding, bruising, pallor     Objective:   Vitals - reviewed  Visit Vitals  /68 (BP 1 Location: Left upper arm, BP Patient Position: Sitting, BP Cuff Size: Adult)   Pulse 68   Temp 97.8 °F (36.6 °C) (Temporal) Resp 16   Ht 5' 5.5\" (1.664 m)   Wt 151 lb (68.5 kg)   SpO2 98%   BMI 24.75 kg/m²        Physical exam:   GEN: NAD. Alert. Well nourished. EYES:  Conjunctiva clear  EAR: External ears are normal.        LUNGS: Respirations unlabored; CTAB. no wheeze, rales, rhonchi   CARDIOVASCULAR: Regular, rate, and rhythm without murmurs, gallops or rubs   ABDOMEN: Soft: mild TTP at RLQ, ND. +bowel sounds; no rebound tenderness, no guarding. no masses or organomegaly  PSYCH: appropriate mood and affect. Good insight and judgement. Cooperative. SKIN: No obvious rashes or lesions. Assessment and orders:   37 yo F w/ no significant PMH presents for evaluation of intermittent RLQ pain x3 days after working out in the gym. RLQ abdominal pain: likely 2/2 muscle strain. Onset 3 says ago after lifting weights and doing exercises that requires abdominal muscle engagement. Unlikely ectopic pregnancy as UPT neg. No hernia detected on exam.   - Advised to hold off on any exercises that might strain the abdomen for at least 4 weeks   - Pt may try OTC pain meds and cold/warm compresses for some relief   - Home stretching exercises (handout given)    *Pt to f/u with me virtually in 4 weeks     Pt was discussed with Dr Hue Wadsworth (attending physician). I have reviewed patient medical and social history and medications. I have reviewed pertinent labs results and other data. I have discussed the diagnosis with the patient and the intended plan as seen in the above orders. The patient has received an after-visit summary and questions were answered concerning future plans. I have discussed medication side effects and warnings with the patient as well.     Yarelis Hutton MD  Resident [facility]  02/08/21

## 2021-02-08 NOTE — PROGRESS NOTES
Chief Complaint   Patient presents with    Abdominal Pain     Patient presents complaining of abdominal pain x 3 days; suspects a hernia. Vitals:    02/08/21 1356   BP: 114/68   BP 1 Location: Left upper arm   BP Patient Position: Sitting   BP Cuff Size: Adult   Pulse: 68   Resp: 16   Temp: 97.8 °F (36.6 °C)   TempSrc: Temporal   SpO2: 98%   Weight: 151 lb (68.5 kg)   Height: 5' 5.5\" (1.664 m)     1. Have you been to the ER, urgent care clinic since your last visit? Hospitalized since your last visit? No     2. Have you seen or consulted any other health care providers outside of the [address] [place] [address] since your last visit? Include any pap smears or colon screening.  No

## 2021-02-08 NOTE — PATIENT INSTRUCTIONS
Pelvic Pain: Care Instructions Your Care Instructions Pelvic pain, or pain in the lower belly, can have many causes. Often pelvic pain is not serious and gets better in a few days. If your pain continues or gets worse, you may need tests and treatment. Tell your doctor about any new symptoms. These may be signs of a serious problem. Follow-up care is a key part of your treatment and safety. Be sure to make and go to all appointments, and call your doctor if you are having problems. It's also a good idea to know your test results and keep a list of the medicines you take. How can you care for yourself at home? · Rest until you feel better. Lie down, and raise your legs by placing a pillow under your knees. · Drink plenty of fluids. You may find that small, frequent sips are easier on your stomach than if you drink a lot at once. Avoid drinks with carbonation or caffeine, such as soda pop, tea, or coffee. · Try eating several small meals instead of 2 or 3 large ones. Eat mild foods, such as rice, dry toast or crackers, bananas, and applesauce. Avoid fatty and spicy foods, other fruits, and alcohol until 48 hours after your symptoms have gone away. · Take an over-the-counter pain medicine, such as acetaminophen (Tylenol), ibuprofen (Advil, Motrin), or naproxen (Aleve). Read and follow all instructions on the label. · Do not take two or more pain medicines at the same time unless the doctor told you to. Many pain medicines have acetaminophen, which is Tylenol. Too much acetaminophen (Tylenol) can be harmful. · You can put a heating pad, a warm cloth, or moist heat on your belly to relieve pain. When should you call for help? Call your doctor now or seek immediate medical care if: 
  · You have a new or higher fever.  
  · You have unusual vaginal bleeding.  
  · You have new or worse belly or pelvic pain.  
  · You have vaginal discharge that has increased in amount or smells bad. Watch closely for changes in your health, and be sure to contact your doctor if: 
  · You do not get better as expected. Where can you learn more? Go to http://www.gray.com/ Enter [phone] in the search box to learn more about \"Pelvic Pain: Care Instructions. \" Current as of: November 8, 2019               Content Version: 12.6 © 8825-8131 [employer]. Care instructions adapted under license by [employer] (which disclaims liability or warranty for this information). If you have questions about a medical condition or this instruction, always ask your healthcare professional. [place] [address] any warranty or liability for your use of this information. Abdominal Strain: Rehab Exercises Introduction Here are some examples of exercises for you to try. The exercises may be suggested for a condition or for rehabilitation. Start each exercise slowly. Ease off the exercises if you start to have pain. You will be told when to start these exercises and which ones will work best for you. How to do the exercises Tummy tuck 1. Lie on your back with your knees bent. Place two fingers just inside your hip bones so you can feel your lower belly muscles. 2. Take a deep breath in. 
3. As you breathe out, pull your belly button in toward your spine, as if you are trying to zip up a tight pair of jeans. You should feel your lower belly muscles pull slightly away from your fingers as the muscles tighten. 4. Hold for about 6 seconds, but do not hold your breath. 5. Relax up to 10 seconds. 6. Repeat 8 to 12 times. 7. Repeat several times a day, and try to hold your lower belly muscles in for longer as you get stronger. 8. Practice doing this exercise while you are standing, such as when you are standing in line, or sitting. Pelvic tilt 1. Lie on your back with your knees bent. 2. \"Brace\" your stomachtighten your muscles by pulling in and imagining your belly button moving toward your spine. 3. Press your lower back into the floor. You should feel your hips and pelvis rock back. 4. Hold for 6 seconds while breathing smoothly. 5. Relax and allow your pelvis and hips to rock forward. 6. Repeat 8 to 12 times. Curl-up 1. Lie down with your knees bent and your arms at your sides. Keep your feet flat on the floor. 2. Lift your head and shoulders up a few inches. At the same time, raise your arms to about thigh level. 3. Hold for 6 seconds. 4. Relax and return to your starting position. 5. Repeat 8 to 12 times. Diagonal curl-up 1. Lie down with your knees bent and your arms at your sides. Keep your feet flat on the floor. 2. Lift your head and shoulders up. At the same time, reach to one side with both arms. 3. Hold for 6 seconds. 4. Relax and return to your starting position. 5. Repeat 8 to 12 times. 6. Repeat the same steps on your other side. Follow-up care is a key part of your treatment and safety. Be sure to make and go to all appointments, and call your doctor if you are having problems. It's also a good idea to know your test results and keep a list of the medicines you take. Where can you learn more? Go to http://www.gray.com/ Enter G693 in the search box to learn more about \"Abdominal Strain: Rehab Exercises. \" Current as of: March 2, 2020               Content Version: 12.6 © 2006-2020 [employer]. Care instructions adapted under license by [employer] (which disclaims liability or warranty for this information). If you have questions about a medical condition or this instruction, always ask your healthcare professional. [address] any warranty or liability for your use of this information. Muscle Strain: Care Instructions Your Care Instructions A muscle strain happens when you overstretch, or pull, a muscle. It can happen when you exercise or lift something or when you have an accident. Rest and other home care can help the muscle heal. 
Follow-up care is a key part of your treatment and safety. Be sure to make and go to all appointments, and call your doctor if you are having problems. It's also a good idea to know your test results and keep a list of the medicines you take. How can you care for yourself at home? · Rest the strained muscle. Do not put weight on it for a day or two. If your doctor advises you to, use crutches or a sling to rest a sore limb. · Put ice or a cold pack on the sore muscle for 10 to 20 minutes at a time to stop swelling. Put a thin cloth between the ice pack and your skin. · Prop up the sore arm or leg on a pillow when you ice it or anytime you sit or lie down during the next 3 days. Try to keep it above the level of your heart. This will help reduce swelling. · Take pain medicines exactly as directed. ? If the doctor gave you a prescription medicine for pain, take it as prescribed. ? If you are not taking a prescription pain medicine, ask your doctor if you can take an over-the-counter medicine. · Do not do anything that makes the pain worse. Return to exercise gradually as you feel better. When should you call for help? Call your doctor now or seek immediate medical care if: 
  · You have new severe pain.  
  · Your injured limb is cool or pale or changes color.  
  · You have tingling, weakness, or numbness in your injured limb.  
  · You cannot move the injured area. Watch closely for changes in your health, and be sure to contact your doctor if: 
  · You cannot put weight on a joint, or it feels unsteady when you walk.  
  · Pain and swelling get worse or do not start to get better after 2 days of home treatment. Where can you learn more? Go to http://www.inthinc/ Enter H007 in the search box to learn more about \"Muscle Strain: Care Instructions. \" Current as of: March 2, 2020               Content Version: 12.6 © 3884-2258 [employer]. Care instructions adapted under license by [employer] (which disclaims liability or warranty for this information). If you have questions about a medical condition or this instruction, always ask your healthcare professional. [place] [address] any warranty or liability for your use of this information.

## 2021-02-09 LAB — HCG UR QL: NEGATIVE

## 2021-03-07 NOTE — PROGRESS NOTES
Martha Alvarez  38 y.o. female  1985  [place] [address]  [id number]   [address]:    Telemedicine Progress Note  Barrett Mathis MD       Encounter Date and Time: March 8, 2021    Consent:  She and/or the health care decision maker is aware that that she may receive a bill for this telephone service, depending on her insurance coverage, and has provided verbal consent to proceed: Yes    Chief Complaint   Patient presents with    Abdominal Pain     History of Present Illness   Martha Alvarez is a 38 y.o. female was evaluated by synchronous (real-time) audio-video technology from home, through the [address] Patient Meeta Thorne is a 38 y.o. F w/ no significant PMH here for f/u on RLQ pain. Pt was seen 4 weeks ago in the clinic for sharp intermittent RLQ pain x3 days that was worse with  planks/pull-up/sit-ups/weight lifting. Pain was attributed to likley muscular strain from exercise (UPT neg). She was advised to refrain from abdominal exercises and manage conservatively at home.      Today, states pelvic pain has resolved with OTC pain meds, cold/warm compresses and home stretching exercises . Pt has also temporarily held off on exercising. Denies N/V/D/fever/chills. - LMP today 3/8/21 (regular menses)       Review of Systems   Review of Systems   Constitutional: Negative for chills, fever and weight loss. Respiratory: Negative for shortness of breath. Cardiovascular: Negative for chest pain and palpitations. Gastrointestinal: Negative for abdominal pain, constipation, diarrhea, nausea and vomiting. Genitourinary: Negative for dysuria. Neurological: Negative for headaches. Psychiatric/Behavioral: Negative for depression.        Vitals/Objective:     General: alert, cooperative, no distress   Mental  status: mental status: alert, oriented to person, place, and time, normal mood, behavior, speech, dress, motor activity, and thought processes   Resp: resp: normal effort and no respiratory distress   Neuro: neuro: no gross deficits   Skin: skin: no discoloration or lesions of concern on visible areas   Due to this being a TeleHealth evaluation, many elements of the physical examination are unable to be assessed. Assessment and Plan:   Time-based coding, delete if not needed: I spent at least 15 minutes with this established patient, and >50% of the time was spent counseling and/or coordinating care regarding her abdominal pain and lifestyle modifications. Assessment/Plan: 38 y.o. F w/ no significant PMH here for f/u on RLQ pain. 1. RLQ Pelvic pain likely 2/2 Muscle strain: resolved. Onset 3 says ago after lifting weights and doing exercises that requires abdominal muscle engagement. Unlikely ectopic pregnancy as UPT neg. No hernia detected on exam.   - May may slowly return to physical activity as tolerated. - Pt to RTC if symptoms return      Patient informed to follow up: in 1-2 weeks for annual well woman visit. Time spent in direct conversation with the patient to include medical condition(s) discussed, assessment and treatment plan:       We discussed the expected course, resolution and complications of the diagnosis(es) in detail. Medication risks, benefits, costs, interactions, and alternatives were discussed as indicated. I advised her to contact the office if her condition worsens, changes or fails to improve as anticipated. She expressed understanding with the diagnosis(es) and plan. Patient understands that this encounter was a temporary measure, and the importance of further follow up and examination was emphasized. Patient verbalized understanding. Electronically Signed: Meche Nesbitt MD    Providers location when delivering service (clinic, hospital, home): home    CPT Codes 76322-94692 for Established Patients may apply to this Telehealth Visit. POS code: 18.   Modifier GT      Pursuant to the emergency declaration under the [facility] waiver authority and the [employer] and [employer] Act, this Virtual  Visit was conducted, with patient's consent, to reduce the patient's risk of exposure to COVID-19 and provide continuity of care for an established patient. Services were provided through a video synchronous discussion virtually to substitute for in-person clinic visit. History   Patients past medical, surgical and family histories were reviewed and updated. No past medical history on file. Past Surgical History:   Procedure Laterality Date    HX GYN  2/1/15         No family history on file.   Social History     Socioeconomic History    Marital status:      Spouse name: Not on file    Number of children: Not on file    Years of education: Not on file    Highest education level: Not on file   Occupational History    Not on file   Social Needs    Financial resource strain: Not on file    Food insecurity     Worry: Not on file     Inability: Not on file    Transportation needs     Medical: Not on file     Non-medical: Not on file   Tobacco Use    Smoking status: Never Smoker    Smokeless tobacco: Never Used   Substance and Sexual Activity    Alcohol use: Yes     Comment: occasionallys    Drug use: No    Sexual activity: Not on file   Lifestyle    Physical activity     Days per week: Not on file     Minutes per session: Not on file    Stress: Not on file   Relationships    Social connections     Talks on phone: Not on file     Gets together: Not on file     Attends Taoist service: Not on file     Active member of club or organization: Not on file     Attends meetings of clubs or organizations: Not on file     Relationship status: Not on file    Intimate partner violence     Fear of current or ex partner: Not on file     Emotionally abused: Not on file Physically abused: Not on file     Forced sexual activity: Not on file   Other Topics Concern    Not on file   Social History Narrative    Not on file     There is no problem list on file for this patient. Current Medications/Allergies   Medications and Allergies reviewed:    Current Outpatient Medications   Medication Sig Dispense Refill    ciprofloxacin HCl (CIPRO) 500 mg tablet TAKE 1 TAB Twice Daily at Onset of Diarrheal symptoms. 10 Tab 0    cyclobenzaprine (FLEXERIL) 10 mg tablet Take 0.5 Tabs by mouth three (3) times daily as needed for Muscle Spasm(s). 15 Tab 0    hydrocortisone (ANUSOL-HC) 2.5 % rectal cream Insert  into rectum four (4) times daily.  30 g 0     No Known Allergies

## 2021-03-08 ENCOUNTER — VIRTUAL VISIT (OUTPATIENT)
Dept: FAMILY MEDICINE CLINIC | Age: 36
End: 2021-03-08
Payer: COMMERCIAL

## 2021-03-08 DIAGNOSIS — R10.2 PELVIC PAIN: ICD-10-CM

## 2021-03-08 DIAGNOSIS — T14.8XXA MUSCLE STRAIN: Primary | ICD-10-CM

## 2021-03-08 PROCEDURE — 99212 OFFICE O/P EST SF 10 MIN: CPT | Performed by: STUDENT IN AN ORGANIZED HEALTH CARE EDUCATION/TRAINING PROGRAM

## 2021-03-09 NOTE — PROGRESS NOTES
[address] Medicine Residency Attending Addendum:  Dr. Trenton Erwin MD,  the patient and I were not physically present during this encounter. The resident and I are concurrently monitoring the patient care through appropriate telecommunication technology. I discussed the findings, assessment and plan with the resident and agree with the resident's findings and plan as documented in the resident's note.       Rajat Garber MD

## 2021-03-12 ENCOUNTER — TELEPHONE (OUTPATIENT)
Dept: FAMILY MEDICINE CLINIC | Age: 36
End: 2021-03-12

## 2021-03-12 NOTE — TELEPHONE ENCOUNTER
----- Message from Jessica Molina MD sent at 3/10/2021  3:47 PM EST -----  Regarding: RE: annual well woman  Okay please schedule for 3/22 instead. Thanks   ----- Message -----  From: Verito Donovan  Sent: 3/10/2021   3:38 PM EST  To: Jessica Molina MD  Subject: RE: annual well woman                            Hi, your schedule is full for Monday. It will need to be another day and on your schedule only as other providers are not accepting in office appointments on their schedules for physicals.        ----- Message -----  From: Mamadou Gr MD  Sent: 3/8/2021   2:09 PM EST  To: [facility] Front Office  Subject: annual well woman                                Please schedule annual well woman with me next Monday 3/15.  Thanks     Jessica Molina MD

## 2021-03-12 NOTE — TELEPHONE ENCOUNTER
[phone]    Left voice mail.     (Per Dr. Nilam Su, really any time she is in clinic for the appt to go on her schedule)

## 2022-12-12 NOTE — PROGRESS NOTES
I was present for and supervised the entire procedure. See resident note for details.  (Minor procedure <5 minutes) no discrete location documentation necessary

## 2023-05-12 RX ORDER — CIPROFLOXACIN 500 MG/1
TABLET, FILM COATED ORAL
COMMUNITY
Start: 2020-01-27

## 2023-05-12 RX ORDER — CYCLOBENZAPRINE HCL 10 MG
TABLET ORAL 3 TIMES DAILY PRN
COMMUNITY
Start: 2018-12-20